# Patient Record
Sex: MALE | Race: WHITE | NOT HISPANIC OR LATINO | Employment: UNEMPLOYED | URBAN - METROPOLITAN AREA
[De-identification: names, ages, dates, MRNs, and addresses within clinical notes are randomized per-mention and may not be internally consistent; named-entity substitution may affect disease eponyms.]

---

## 2022-02-15 ENCOUNTER — OFFICE VISIT (OUTPATIENT)
Dept: ORTHOPEDIC SURGERY | Facility: CLINIC | Age: 52
End: 2022-02-15

## 2022-02-15 VITALS
BODY MASS INDEX: 26.53 KG/M2 | HEIGHT: 68 IN | DIASTOLIC BLOOD PRESSURE: 90 MMHG | WEIGHT: 175.04 LBS | SYSTOLIC BLOOD PRESSURE: 150 MMHG

## 2022-02-15 DIAGNOSIS — M25.512 ACUTE PAIN OF LEFT SHOULDER: Primary | ICD-10-CM

## 2022-02-15 DIAGNOSIS — S42.255A CLOSED NONDISPLACED FRACTURE OF GREATER TUBEROSITY OF LEFT HUMERUS, INITIAL ENCOUNTER: ICD-10-CM
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
## 2022-02-15 PROCEDURE — 99204 OFFICE O/P NEW MOD 45 MIN: CPT | Performed by: PHYSICIAN ASSISTANT

## 2022-02-15 PROCEDURE — 23620 CLTX GR HMRL TBRS FX WO MNPJ: CPT | Performed by: PHYSICIAN ASSISTANT

## 2022-02-15 NOTE — PROGRESS NOTES
[facility] – [place], [place] [facility] Note    Subjective     Chief Complaint   Patient presents with   • Left Upper Arm - Pain   DOI: 2/4/2022     HPI  Arun Young is a 52 y.o. male.  Right-hand-dominant. New patient presents for evaluation of left shoulder/upper arm pain.  ROCHELLE: Fell while picking up his child onto left side.    Pain scale: 8/10.  Severity of the pain moderate.  Quality of the pain dull, aching, throbbing.  Associated symptoms pain only.  Activity related to pain working.  Pain relieved by resting, ice, medication, lying down.  No reported numbness or tingling.  Prior treatments wearing sling.    Reports waking in morning with nausea and dizziness. Taking NSAID and acetaminophen/Tylenol.    Denies fever, chills, night sweats or other constitutional symptoms.      History reviewed. No pertinent past medical history.   Past Surgical History:   Procedure Laterality Date   • ADENOIDECTOMY     • APPENDECTOMY     • COLON SURGERY     • HERNIA REPAIR     • TONSILLECTOMY        Family History   Problem Relation Age of Onset   • No Known Problems Mother    • No Known Problems Father      Social History     Socioeconomic History   • Marital status:    Tobacco Use   • Smoking status: Current Some Day Smoker     Types: Cigarettes   • Smokeless tobacco: Never Used   Vaping Use   • Vaping Use: Never used   Substance and Sexual Activity   • Alcohol use: Yes   • Drug use: Defer   • Sexual activity: Defer      No current outpatient medications on file prior to visit.     No current facility-administered medications on file prior to visit.      No Known Allergies     The following portions of the patient's history were reviewed and updated as appropriate: allergies, current medications, past family history, past medical history, past social history, past surgical history and problem list.    Review of Systems   Constitutional: Negative.    HENT: Negative.    Eyes: Negative.    Respiratory: Negative.   "  Cardiovascular: Negative.    Gastrointestinal: Negative.    Endocrine: Negative.    Genitourinary: Negative.    Musculoskeletal: Positive for arthralgias.   Skin: Negative.    Allergic/Immunologic: Negative.    Neurological: Negative.    Hematological: Negative.    Psychiatric/Behavioral: Negative.         Objective      Physical Exam  /90   Ht 172.7 cm (67.99\")   Wt 79.4 kg (175 lb 0.7 oz)   BMI 26.62 kg/m²     Body mass index is 26.62 kg/m².    GENERAL APPEARANCE: awake, alert & oriented x 3, in no acute distress and well developed, well nourished  PSYCH: normal mood and affect  LUNGS:  breathing nonlabored, no wheezing  EYES: sclera anicteric, pupils equal  CARDIOVASCULAR: palpable pulses. Capillary refill less than 2 seconds  INTEGUMENTARY: skin intact, no clubbing, cyanosis  NEUROLOGIC:  Normal Sensation         Ortho Exam  Left Shoulder  Skin: Intact without any erythema, warmth or swelling.    Tenderness: Global tenderness throughout shoulder.  Motion: Not assessed at shoulder secondary to fracture. Positive stiffness to elbow and wrist.  Motor: Grossly intact to Ax/MSC/R/U/M/AIN/PIN  Sensory: Grossly intact to Ax/MSC/R/U/M nerve distributions.  Vascular: 2+ radial pulse with brisk capillary refill into each digit.      Imaging/Studies  Dr. Berrios and I reviewed plain film imaging performed on 2/6/2022 of his left humerus and shoulder.    EXAMINATION: XR SHOULDER 2+ VW LEFT-, XR HUMERUS LEFT-      INDICATION: left shoulder pain     TECHNIQUE: 3 images of the left shoulder, 2 images of the left humerus     COMPARISON: NONE     FINDINGS:      Acute mildly displaced comminuted fracture of the greater tuberosity. No  evidence of fracture extension to involve the humeral neck or humeral  head articular surface. The glenohumeral joint appears congruent without  dislocation. Normal appearance of the acromioclavicular joint. No  evidence of fracture or malalignment in the mid or distal humerus. " The  partially imaged left hemithorax appears unremarkable. Surgical clips  are noted in the left upper quadrant.     IMPRESSION:     Acute mildly comminuted and mildly displaced fracture involving the  greater tuberosity.     This report was finalized on 2/6/2022 11:26 AM by Rg Luong MD.    Assessment/Plan        ICD-10-CM ICD-9-CM   1. Acute pain of left shoulder  M25.512 719.41   2. Closed nondisplaced fracture of greater tuberosity of left humerus, initial encounter  S42.255A 812.03   3. Fall from slip, trip, or stumble, initial encounter  W01.0XXA E885.9       No orders of the defined types were placed in this encounter.       -Left shoulder pain due to greater tuberosity fracture secondary to fall.  -Continue sling for stability and support--includes sleeping in it.  -Remain NWB to LUE.  -Encourage gentle ROM elbow, wrist and digits.  -DC NSAIDs may recommend continue with acetaminophen/Tylenol.  If nausea and dizziness continue, patient should follow up with PCP.   -Provided letter for work: No use of left arm.    -Follow up in 1 week for repeat evaluation to include AP and scapular Y shoulder.  -Questions and concerns answered.    History, exam and imaging all discussed with Dr. Berrios who agrees with the above assessment and plan.      Medical Decision Making  Management Options : over-the-counter medicine and close treatment of fracture or dislocation  Data/Risk: radiology tests       Devika Zhang PA-C  02/15/22  13:46 EST               EMR Dragon/Transcription disclaimer:  Much of this encounter note is an electronic transcription of spoken language to printed text. Electronic transcription of spoken language may permit erroneous, or at times, nonsensical words or phrases to be inadvertently transcribed. Although I have reviewed the note for such errors, some may still exist.

## 2023-01-19 ENCOUNTER — OFFICE VISIT (OUTPATIENT)
Dept: FAMILY MEDICINE CLINIC | Facility: CLINIC | Age: 53
End: 2023-01-19
Payer: MEDICAID

## 2023-01-19 ENCOUNTER — PATIENT ROUNDING (BHMG ONLY) (OUTPATIENT)
Dept: FAMILY MEDICINE CLINIC | Facility: CLINIC | Age: 53
End: 2023-01-19
Payer: MEDICAID

## 2023-01-19 ENCOUNTER — LAB (OUTPATIENT)
Dept: LAB | Facility: HOSPITAL | Age: 53
End: 2023-01-19
Payer: MEDICAID

## 2023-01-19 ENCOUNTER — HOSPITAL ENCOUNTER (OUTPATIENT)
Dept: GENERAL RADIOLOGY | Facility: HOSPITAL | Age: 53
Discharge: HOME | End: 2023-01-19
Payer: MEDICAID

## 2023-01-19 VITALS
HEART RATE: 80 BPM | WEIGHT: 166 LBS | HEIGHT: 68 IN | OXYGEN SATURATION: 98 % | SYSTOLIC BLOOD PRESSURE: 136 MMHG | DIASTOLIC BLOOD PRESSURE: 82 MMHG | BODY MASS INDEX: 25.16 KG/M2

## 2023-01-19 DIAGNOSIS — Z00.00 PREVENTATIVE HEALTH CARE: Primary | ICD-10-CM

## 2023-01-19 DIAGNOSIS — M25.572 CHRONIC PAIN OF BOTH ANKLES: ICD-10-CM

## 2023-01-19 DIAGNOSIS — M25.571 CHRONIC PAIN OF BOTH ANKLES: ICD-10-CM

## 2023-01-19 DIAGNOSIS — Z71.41 ENCOUNTER FOR COUNSELING AND SURVEILLANCE FOR ALCOHOL USE DISORDER: ICD-10-CM

## 2023-01-19 DIAGNOSIS — Z87.19 HISTORY OF DIVERTICULITIS: ICD-10-CM

## 2023-01-19 DIAGNOSIS — G89.29 CHRONIC PAIN OF BOTH ANKLES: ICD-10-CM

## 2023-01-19 DIAGNOSIS — Z00.00 PREVENTATIVE HEALTH CARE: ICD-10-CM

## 2023-01-19 DIAGNOSIS — Z12.11 COLON CANCER SCREENING: ICD-10-CM

## 2023-01-19 DIAGNOSIS — E55.9 VITAMIN D DEFICIENCY: ICD-10-CM

## 2023-01-19 LAB
25(OH)D3 SERPL-MCNC: 21.1 NG/ML (ref 30–100)
ALBUMIN SERPL-MCNC: 4.2 G/DL (ref 3.5–5.2)
ALBUMIN/GLOB SERPL: 1.4 G/DL
ALP SERPL-CCNC: 78 U/L (ref 39–117)
ALT SERPL W P-5'-P-CCNC: 22 U/L (ref 1–41)
ANION GAP SERPL CALCULATED.3IONS-SCNC: 10.6 MMOL/L (ref 5–15)
AST SERPL-CCNC: 19 U/L (ref 1–40)
BACTERIA UR QL AUTO: NORMAL /HPF
BASOPHILS # BLD AUTO: 0.08 10*3/MM3 (ref 0–0.2)
BASOPHILS NFR BLD AUTO: 1 % (ref 0–1.5)
BILIRUB SERPL-MCNC: 0.2 MG/DL (ref 0–1.2)
BILIRUB UR QL STRIP: NEGATIVE
BUN SERPL-MCNC: 10 MG/DL (ref 6–20)
BUN/CREAT SERPL: 13.2 (ref 7–25)
CALCIUM SPEC-SCNC: 9.7 MG/DL (ref 8.6–10.5)
CHLORIDE SERPL-SCNC: 106 MMOL/L (ref 98–107)
CHOLEST SERPL-MCNC: 228 MG/DL (ref 0–200)
CLARITY UR: CLEAR
CO2 SERPL-SCNC: 24.4 MMOL/L (ref 22–29)
COLOR UR: YELLOW
CREAT SERPL-MCNC: 0.76 MG/DL (ref 0.76–1.27)
DEPRECATED RDW RBC AUTO: 49 FL (ref 37–54)
EGFRCR SERPLBLD CKD-EPI 2021: 108.8 ML/MIN/1.73
EOSINOPHIL # BLD AUTO: 0.31 10*3/MM3 (ref 0–0.4)
EOSINOPHIL NFR BLD AUTO: 3.8 % (ref 0.3–6.2)
ERYTHROCYTE [DISTWIDTH] IN BLOOD BY AUTOMATED COUNT: 12.9 % (ref 12.3–15.4)
GLOBULIN UR ELPH-MCNC: 2.9 GM/DL
GLUCOSE SERPL-MCNC: 87 MG/DL (ref 65–99)
GLUCOSE UR STRIP-MCNC: NEGATIVE MG/DL
HBA1C MFR BLD: 4.9 % (ref 4.8–5.6)
HCT VFR BLD AUTO: 36.7 % (ref 37.5–51)
HDLC SERPL-MCNC: 48 MG/DL (ref 40–60)
HGB BLD-MCNC: 12.7 G/DL (ref 13–17.7)
HGB UR QL STRIP.AUTO: NEGATIVE
HYALINE CASTS UR QL AUTO: NORMAL /LPF
IMM GRANULOCYTES # BLD AUTO: 0.11 10*3/MM3 (ref 0–0.05)
IMM GRANULOCYTES NFR BLD AUTO: 1.4 % (ref 0–0.5)
KETONES UR QL STRIP: NEGATIVE
LDLC SERPL CALC-MCNC: 155 MG/DL (ref 0–100)
LDLC/HDLC SERPL: 3.16 {RATIO}
LEUKOCYTE ESTERASE UR QL STRIP.AUTO: ABNORMAL
LYMPHOCYTES # BLD AUTO: 1.79 10*3/MM3 (ref 0.7–3.1)
LYMPHOCYTES NFR BLD AUTO: 22.1 % (ref 19.6–45.3)
MCH RBC QN AUTO: 35.9 PG (ref 26.6–33)
MCHC RBC AUTO-ENTMCNC: 34.6 G/DL (ref 31.5–35.7)
MCV RBC AUTO: 103.7 FL (ref 79–97)
MONOCYTES # BLD AUTO: 0.72 10*3/MM3 (ref 0.1–0.9)
MONOCYTES NFR BLD AUTO: 8.9 % (ref 5–12)
NEUTROPHILS NFR BLD AUTO: 5.08 10*3/MM3 (ref 1.7–7)
NEUTROPHILS NFR BLD AUTO: 62.8 % (ref 42.7–76)
NITRITE UR QL STRIP: NEGATIVE
NRBC BLD AUTO-RTO: 0 /100 WBC (ref 0–0.2)
PH UR STRIP.AUTO: 5.5 [PH] (ref 5–8)
PLATELET # BLD AUTO: 285 10*3/MM3 (ref 140–450)
PMV BLD AUTO: 8.9 FL (ref 6–12)
POTASSIUM SERPL-SCNC: 4.2 MMOL/L (ref 3.5–5.2)
PROT SERPL-MCNC: 7.1 G/DL (ref 6–8.5)
PROT UR QL STRIP: NEGATIVE
RBC # BLD AUTO: 3.54 10*6/MM3 (ref 4.14–5.8)
RBC # UR STRIP: NORMAL /HPF
REF LAB TEST METHOD: NORMAL
SODIUM SERPL-SCNC: 141 MMOL/L (ref 136–145)
SP GR UR STRIP: 1.01 (ref 1–1.03)
SQUAMOUS #/AREA URNS HPF: NORMAL /HPF
T4 FREE SERPL-MCNC: 0.71 NG/DL (ref 0.93–1.7)
TRIGL SERPL-MCNC: 141 MG/DL (ref 0–150)
TSH SERPL DL<=0.05 MIU/L-ACNC: 3.47 UIU/ML (ref 0.27–4.2)
UROBILINOGEN UR QL STRIP: ABNORMAL
VLDLC SERPL-MCNC: 25 MG/DL (ref 5–40)
WBC # UR STRIP: NORMAL /HPF
WBC NRBC COR # BLD: 8.09 10*3/MM3 (ref 3.4–10.8)

## 2023-01-19 PROCEDURE — 99386 PREV VISIT NEW AGE 40-64: CPT | Performed by: INTERNAL MEDICINE

## 2023-01-19 PROCEDURE — 80061 LIPID PANEL: CPT

## 2023-01-19 PROCEDURE — 3008F BODY MASS INDEX DOCD: CPT | Performed by: INTERNAL MEDICINE

## 2023-01-19 PROCEDURE — 85025 COMPLETE CBC W/AUTO DIFF WBC: CPT

## 2023-01-19 PROCEDURE — 84439 ASSAY OF FREE THYROXINE: CPT

## 2023-01-19 PROCEDURE — 81001 URINALYSIS AUTO W/SCOPE: CPT

## 2023-01-19 PROCEDURE — 84443 ASSAY THYROID STIM HORMONE: CPT

## 2023-01-19 PROCEDURE — 2014F MENTAL STATUS ASSESS: CPT | Performed by: INTERNAL MEDICINE

## 2023-01-19 PROCEDURE — 73610 X-RAY EXAM OF ANKLE: CPT

## 2023-01-19 PROCEDURE — 83036 HEMOGLOBIN GLYCOSYLATED A1C: CPT

## 2023-01-19 PROCEDURE — 80053 COMPREHEN METABOLIC PANEL: CPT

## 2023-01-19 PROCEDURE — 82306 VITAMIN D 25 HYDROXY: CPT

## 2023-01-19 RX ORDER — NALTREXONE HYDROCHLORIDE 50 MG/1
TABLET, FILM COATED ORAL
COMMUNITY
Start: 2023-01-16

## 2023-01-20 NOTE — PROGRESS NOTES
Chief Complaint   Patient presents with   • Establish Care   • Alcohol Problem     Recovering alcoholic and would like labs to check overall health.    • Lower Extremity Issue     Swelling and itchiness in legs and feet.        HPI:  Arun Young is a 52 y.o. male who presents today for establish care.  History of alcohol abuse.    ROS:  Constitutional: no fevers, night sweats or unexplained weight loss  Eyes: no vision changes  ENT: no runny nose, ear pain, sore throat  Cardio: no chest pain, palpitations  Pulm: no shortness of breath, wheezing, or cough  GI: no abdominal pain or changes in bowel movements  : no difficulty urinating  MSK: no difficulty ambulating, no joint pain  Neuro: no weakness, dizziness or headache  Psych: no trouble sleeping  Endo: no change in appetite      Past Medical History:   Diagnosis Date   • Substance abuse (HCC) 12/27/2022      Family History   Problem Relation Age of Onset   • No Known Problems Mother    • No Known Problems Father    • Stroke Maternal Grandfather    • Stroke Maternal Grandmother       Social History     Socioeconomic History   • Marital status: Single   Tobacco Use   • Smoking status: Some Days     Types: Electronic Cigarette   • Smokeless tobacco: Never   • Tobacco comments:     Smoked on and off since teenager   Vaping Use   • Vaping Use: Never used   Substance and Sexual Activity   • Alcohol use: Not Currently   • Drug use: Never   • Sexual activity: Yes     Partners: Female     Birth control/protection: Condom      No Known Allergies     There is no immunization history on file for this patient.     PE:  Vitals:    01/19/23 0748   BP: 136/82   Pulse: 80   SpO2: 98%      Body mass index is 25.25 kg/m².    Gen Appearance: NAD  HEENT: Normocephalic, PERRLA, no thyromegaly, trache midline  Heart: RRR, normal S1 and S2, no murmur  Lungs: CTA b/l, no wheezing, no crackles  Abdomen: Soft, non-tender, non-distended, no guarding and BSx4  MSK: Moves all extremities  well, normal gait, no peripheral edema  Pulses: Palpable and equal b/l  Lymph nodes: No palpable lymphadenopathy   Neuro: No focal deficits      Current Outpatient Medications   Medication Sig Dispense Refill   • naltrexone (DEPADE) 50 MG tablet        No current facility-administered medications for this visit.        Diagnoses and all orders for this visit:    1. Preventative health care (Primary)  -     CBC & Differential; Future  -     Comprehensive Metabolic Panel; Future  -     Hemoglobin A1c; Future  -     Lipid Panel; Future  -     TSH+Free T4; Future  -     Vitamin D,25-Hydroxy; Future  -     Urinalysis With Culture If Indicated - Urine, Clean Catch; Future  Counseled on healthy weight, nutrition, physical activity, cancer screening, and immunizations.    2. Encounter for counseling and surveillance for alcohol use disorder    3. History of diverticulitis    4. Colon cancer screening  -     Ambulatory Referral For Screening Colonoscopy    5. Vitamin D deficiency  -     Vitamin D,25-Hydroxy; Future    6. Chronic pain of both ankles  -     Cancel: XR Ankle 2 View Bilateral; Future         No follow-ups on file.     Dictated Utilizing Dragon Dictation    Please note that portions of this note were completed with a voice recognition program.    Part of this note may be an electronic transcription/translation of spoken language to printed text using the Dragon Dictation System.

## 2023-01-25 DIAGNOSIS — E03.9 HYPOTHYROIDISM, UNSPECIFIED TYPE: Primary | ICD-10-CM

## 2023-01-31 DIAGNOSIS — G62.9 PERIPHERAL POLYNEUROPATHY: Primary | ICD-10-CM

## 2023-02-09 NOTE — PROGRESS NOTES
Neuro Office Visit      Encounter Date: 02/10/2023   Patient Name: Arun Young  : 1970   MRN: [id number]   PCP: Dr Amezcua  Chief Complaint:    Chief Complaint   Patient presents with   • Establish Care     NP PERIPHERAL POLYNEUROPATHY NPP NEEDED TRAVEL SCREENING COMP W/HUB 23  PT fell on back and since then feels extreme numbness in feet and legs        History of Present Illness: Arun Young is a 53 y.o. male who is here today in Neurology for neuropathy    Neuropathy  Fell several months ago. Legs went numb a week later. Knees to feet with decreased sensation.  Sharp pain and worse with movement. Dull ache in knees. Denies burning sensation. Intermittent pins and needles sensation. Most pain was in knees and feet. Still has itching sensation. Can be severe. Worse in calf and lateral aspect.  Can keep him awake at night.   Tx'd anti-itch cream.  Symptoms are not progressive.   Difficult to stand for long periods of times. Feels the urge to move his legs.      Alcohol Abuse  During the pandemic he drank a 5th a whiskey a day for 2 years.  [facility]. AA. Needs a sponsor. Taking naltrexone.  Had a DUI and was in skilled nursing. Had edema in bilat lower ext. Treated with compression socks. No further swelling.     Healthy diet.  No exposure to chemo or radiation. No tick bites.    Labs:cmp, A1c, tsh, cbc  Vit D def-treated with otc meds.  PMH: alcohol abuse  FH:stroke. Neg FH of neuropathy  SH: tob use  Subjective      Past Medical History:   Past Medical History:   Diagnosis Date   • Alcohol abuse 02/10/2023   • Difficulty walking 2022    Had a fall that has affected my legs i believe   • Diverticulitis    • Headache, tension-type 2020    Sometimes but not all the time   • Substance abuse (HCC) 2022       Past Surgical History:   Past Surgical History:   Procedure Laterality Date   • ADENOIDECTOMY     • APPENDECTOMY     • COLON SURGERY     • HERNIA REPAIR     • TONSILLECTOMY         Family  History:   Family History   Problem Relation Age of Onset   • No Known Problems Mother    • Dementia Father         Developed during covid   • No Known Problems Sister    • No Known Problems Sister    • Stroke Maternal Grandmother    • Stroke Maternal Grandfather    • Parkinsonism Paternal Grandfather    • No Known Problems Son    • No Known Problems Son        Social History:   Social History     Socioeconomic History   • Marital status: Single   Tobacco Use   • Smoking status: Some Days     Types: Electronic Cigarette   • Smokeless tobacco: Never   • Tobacco comments:     Smoked on and off since teenager   Vaping Use   • Vaping Use: Every day   Substance and Sexual Activity   • Alcohol use: Not Currently   • Drug use: Never   • Sexual activity: Yes     Partners: Female     Birth control/protection: Condom       Medications:     Current Outpatient Medications:   •  multivitamin with minerals tablet tablet, Take 1 tablet by mouth Daily., Disp: , Rfl:   •  naltrexone (DEPADE) 50 MG tablet, , Disp: , Rfl:   •  vitamin D3 125 MCG (5000 UT) capsule capsule, Take 5,000 Units by mouth Daily., Disp: , Rfl:   •  amitriptyline (ELAVIL) 25 MG tablet, Take 1 tablet by mouth Every Night., Disp: 30 tablet, Rfl: 5  •  loratadine (Claritin) 10 MG tablet, Take 1 tablet by mouth Daily., Disp: 30 tablet, Rfl: 2    Allergies:   No Known Allergies    PHQ-9 Total Score:     STEADI Fall Risk Assessment has not been completed.    Objective     Physical Exam:   Physical Exam  Eyes:      Pupils: Pupils are equal, round, and reactive to light.   Neurological:      Mental Status: He is oriented to person, place, and time.      Gait: Tandem walk normal.      Deep Tendon Reflexes:      Reflex Scores:       Tricep reflexes are 2+ on the right side and 2+ on the left side.       Bicep reflexes are 2+ on the right side and 2+ on the left side.       Brachioradialis reflexes are 2+ on the right side and 2+ on the left side.       Patellar reflexes  are 2+ on the right side and 2+ on the left side.       Achilles reflexes are 2+ on the right side and 2+ on the left side.  Psychiatric:         Speech: Speech normal.         Neurologic Exam     Mental Status   Oriented to person, place, and time.   Follows 3 step commands.   Attention: normal. Concentration: normal.   Speech: speech is normal   Level of consciousness: alert  Knowledge: consistent with education.   Normal comprehension.     Cranial Nerves     CN III, IV, VI   Pupils are equal, round, and reactive to light.  Right pupil: Accommodation: intact.   Left pupil: Accommodation: intact.   CN III: no CN III palsy  CN VI: no CN VI palsy  Nystagmus: none   Diplopia: none  Upgaze: normal  Downgaze: normal  Conjugate gaze: present    CN VII   Facial expression full, symmetric.     CN VIII   Hearing: intact    CN XII   CN XII normal.     Motor Exam   Muscle bulk: normal  Overall muscle tone: normal    Strength   Right biceps: 5/5  Left biceps: 5/5  Right triceps: 5/5  Left triceps: 5/5  Right interossei: 5/5  Left interossei: 5/5  Right quadriceps: 5/5  Left quadriceps: 5/5  Right anterior tibial: 5/5  Left anterior tibial: 5/5  Right posterior tibial: 5/5  Left posterior tibial: 5/5    Sensory Exam   Right arm light touch: normal  Left arm light touch: normal  Right leg light touch: decreased from toes  Left leg light touch: decreased from toes    Gait, Coordination, and Reflexes     Gait  Gait: wide-based    Coordination   Tandem walking coordination: normal    Tremor   Resting tremor: absent  Action tremor: absent    Reflexes   Right brachioradialis: 2+  Left brachioradialis: 2+  Right biceps: 2+  Left biceps: 2+  Right triceps: 2+  Left triceps: 2+  Right patellar: 2+  Left patellar: 2+  Right achilles: 2+  Left achilles: 2+  Right : 2+  Left : 2+High stepping gait        Vital Signs:   Vitals:    02/10/23 1453   BP: 150/78   BP Location: Left arm   Patient Position: Sitting   Cuff Size: Adult  "  Pulse: 75   SpO2: 99%  Comment: resting room air   Weight: 77.6 kg (171 lb)   Height: 172.7 cm (67.99\")     Body mass index is 26.01 kg/m².     Results:   Imaging:   XR Ankle 3+ View Bilateral    Result Date: 1/19/2023  Impression: No acute osseous abnormality or significant degenerative change. Electronically Signed: Cherelle Crespo  1/19/2023 5:26 PM EST  Workstation ID: [id number]         Assessment / Plan      Assessment/Plan:   Diagnoses and all orders for this visit:    1. Neuropathic pain (Primary)  Comments:  Check labs, emg. Start elavil. Likely due to etoh use.  Orders:  -     CK; Future  -     C-reactive Protein; Future  -     Cryoglobulin; Future  -     Heavy Metals Profile II, Urine - Urine, Clean Catch; Future  -     Hemoglobin A1c; Future  -     FOREST + PE; Future  -     ADITYA by IFA, Reflex 9-biomarkers profile; Future  -     Ammonia; Future  -     EMG & Nerve Conduction Test; Future  -     amitriptyline (ELAVIL) 25 MG tablet; Take 1 tablet by mouth Every Night.  Dispense: 30 tablet; Refill: 5  -     loratadine (Claritin) 10 MG tablet; Take 1 tablet by mouth Daily.  Dispense: 30 tablet; Refill: 2    2. History of alcohol abuse  Comments:  Congratulation on sobriety. Cont w AA and rehab.         Patient Education:     Reviewed medications, potential side effects and signs and symptoms to report. Discussed risk versus benefits of treatment plan with patient and/or family-including medications, labs and radiology that may be ordered. Addressed questions and concerns during visit. Patient and/or family verbalized understanding and agree with plan. Instructed to call the office with any questions and report to ER with any life-threatening symptoms.     Follow Up:   Return in about 3 months (around 5/10/2023) for Recheck.    During this visit the following were done:  Labs Reviewed [x]    Labs Ordered [x]    Radiology Reports Reviewed []    Radiology Ordered []    PCP Records Reviewed [x]    Referring Provider " Records Reviewed []    ER Records Reviewed []    Hospital Records Reviewed []    History Obtained From Family []    Radiology Images Reviewed []    Other Reviewed [x]    Records Requested []      Lexi Carlos, DNP, APRN

## 2023-02-10 ENCOUNTER — LAB (OUTPATIENT)
Dept: LAB | Facility: HOSPITAL | Age: 53
End: 2023-02-10
Payer: MEDICAID

## 2023-02-10 ENCOUNTER — OFFICE VISIT (OUTPATIENT)
Dept: NEUROLOGY | Facility: CLINIC | Age: 53
End: 2023-02-10
Payer: MEDICAID

## 2023-02-10 VITALS
WEIGHT: 171 LBS | OXYGEN SATURATION: 99 % | HEART RATE: 75 BPM | SYSTOLIC BLOOD PRESSURE: 150 MMHG | DIASTOLIC BLOOD PRESSURE: 78 MMHG | HEIGHT: 68 IN | BODY MASS INDEX: 25.91 KG/M2

## 2023-02-10 DIAGNOSIS — M79.2 NEUROPATHIC PAIN: ICD-10-CM

## 2023-02-10 DIAGNOSIS — M79.2 NEUROPATHIC PAIN: Primary | ICD-10-CM

## 2023-02-10 DIAGNOSIS — F10.11 HISTORY OF ALCOHOL ABUSE: ICD-10-CM

## 2023-02-10 PROBLEM — F19.10 SUBSTANCE ABUSE: Status: ACTIVE | Noted: 2022-12-27

## 2023-02-10 PROBLEM — K57.92 DIVERTICULITIS: Status: ACTIVE | Noted: 2023-02-10

## 2023-02-10 PROBLEM — R26.2 DIFFICULTY WALKING: Status: ACTIVE | Noted: 2022-09-01

## 2023-02-10 PROBLEM — G44.209 HEADACHE, TENSION-TYPE: Status: ACTIVE | Noted: 2020-02-01

## 2023-02-10 PROBLEM — F10.10 ALCOHOL ABUSE: Status: ACTIVE | Noted: 2023-02-10

## 2023-02-10 LAB
AMMONIA BLD-SCNC: 14 UMOL/L (ref 16–60)
CK SERPL-CCNC: 65 U/L (ref 20–200)
CRP SERPL-MCNC: 0.52 MG/DL (ref 0–0.5)
HBA1C MFR BLD: 5.3 % (ref 4.8–5.6)

## 2023-02-10 PROCEDURE — 86235 NUCLEAR ANTIGEN ANTIBODY: CPT

## 2023-02-10 PROCEDURE — 82570 ASSAY OF URINE CREATININE: CPT

## 2023-02-10 PROCEDURE — 86038 ANTINUCLEAR ANTIBODIES: CPT

## 2023-02-10 PROCEDURE — 82784 ASSAY IGA/IGD/IGG/IGM EACH: CPT

## 2023-02-10 PROCEDURE — 84165 PROTEIN E-PHORESIS SERUM: CPT

## 2023-02-10 PROCEDURE — 82140 ASSAY OF AMMONIA: CPT

## 2023-02-10 PROCEDURE — 86225 DNA ANTIBODY NATIVE: CPT

## 2023-02-10 PROCEDURE — 82595 ASSAY OF CRYOGLOBULIN: CPT

## 2023-02-10 PROCEDURE — 99214 OFFICE O/P EST MOD 30 MIN: CPT | Performed by: NURSE PRACTITIONER

## 2023-02-10 PROCEDURE — 86334 IMMUNOFIX E-PHORESIS SERUM: CPT

## 2023-02-10 PROCEDURE — 84155 ASSAY OF PROTEIN SERUM: CPT

## 2023-02-10 PROCEDURE — 82175 ASSAY OF ARSENIC: CPT

## 2023-02-10 PROCEDURE — 83825 ASSAY OF MERCURY: CPT

## 2023-02-10 PROCEDURE — 83655 ASSAY OF LEAD: CPT

## 2023-02-10 PROCEDURE — 83036 HEMOGLOBIN GLYCOSYLATED A1C: CPT

## 2023-02-10 PROCEDURE — 86140 C-REACTIVE PROTEIN: CPT

## 2023-02-10 PROCEDURE — 36415 COLL VENOUS BLD VENIPUNCTURE: CPT

## 2023-02-10 PROCEDURE — 82550 ASSAY OF CK (CPK): CPT

## 2023-02-10 PROCEDURE — 82300 ASSAY OF CADMIUM: CPT

## 2023-02-10 RX ORDER — LORATADINE 10 MG/1
10 TABLET ORAL DAILY
Qty: 30 TABLET | Refills: 2 | Status: SHIPPED | OUTPATIENT
Start: 2023-02-10 | End: 2024-02-10

## 2023-02-10 RX ORDER — MULTIPLE VITAMINS W/ MINERALS TAB 9MG-400MCG
1 TAB ORAL DAILY
COMMUNITY

## 2023-02-10 RX ORDER — AMITRIPTYLINE HYDROCHLORIDE 25 MG/1
25 TABLET, FILM COATED ORAL NIGHTLY
Qty: 30 TABLET | Refills: 5 | Status: SHIPPED | OUTPATIENT
Start: 2023-02-10

## 2023-02-13 LAB
ALBUMIN SERPL ELPH-MCNC: 3.6 G/DL (ref 2.9–4.4)
ALBUMIN/GLOB SERPL: 1.2 {RATIO} (ref 0.7–1.7)
ALPHA1 GLOB SERPL ELPH-MCNC: 0.2 G/DL (ref 0–0.4)
ALPHA2 GLOB SERPL ELPH-MCNC: 0.7 G/DL (ref 0.4–1)
B-GLOBULIN SERPL ELPH-MCNC: 1.1 G/DL (ref 0.7–1.3)
GAMMA GLOB SERPL ELPH-MCNC: 1.1 G/DL (ref 0.4–1.8)
GLOBULIN SER-MCNC: 3.1 G/DL (ref 2.2–3.9)
IGA SERPL-MCNC: 375 MG/DL (ref 90–386)
IGG SERPL-MCNC: 1048 MG/DL (ref 603–1613)
IGM SERPL-MCNC: 112 MG/DL (ref 20–172)
INTERPRETATION SERPL IEP-IMP: NORMAL
LABORATORY COMMENT REPORT: NORMAL
M PROTEIN SERPL ELPH-MCNC: NORMAL G/DL
PROT SERPL-MCNC: 6.7 G/DL (ref 6–8.5)

## 2023-02-14 LAB
ARSENIC UR-MCNC: ABNORMAL UG/L (ref 0–9)
CADMIUM 24H UR-MCNC: ABNORMAL UG/L
CREAT UR-MCNC: 0.22 G/L (ref 0.3–3)
LEAD 24H UR-MCNC: ABNORMAL UG/L (ref 0–49)
MERCURY 24H UR-MCNC: ABNORMAL UG/L (ref 0–19)

## 2023-02-16 LAB
ANA SER QL IF: POSITIVE
ANA SPECKLED TITR SER: ABNORMAL {TITER}
CENTROMERE B AB SER-ACNC: <0.2 AI (ref 0–0.9)
CHROMATIN AB SERPL-ACNC: <0.2 AI (ref 0–0.9)
CRYOGLOB SER QL 1D COLD INC: NORMAL
DSDNA AB SER-ACNC: 1 IU/ML (ref 0–9)
ENA JO1 AB SER-ACNC: <0.2 AI (ref 0–0.9)
ENA RNP AB SER-ACNC: 0.5 AI (ref 0–0.9)
ENA SCL70 AB SER-ACNC: <0.2 AI (ref 0–0.9)
ENA SM AB SER-ACNC: <0.2 AI (ref 0–0.9)
ENA SS-A AB SER-ACNC: <0.2 AI (ref 0–0.9)
ENA SS-B AB SER-ACNC: <0.2 AI (ref 0–0.9)
LABORATORY COMMENT REPORT: ABNORMAL
Lab: ABNORMAL
Lab: ABNORMAL

## 2023-02-17 ENCOUNTER — TELEPHONE (OUTPATIENT)
Dept: NEUROLOGY | Facility: CLINIC | Age: 53
End: 2023-02-17
Payer: MEDICAID

## 2023-02-17 NOTE — TELEPHONE ENCOUNTER
Called patient and left  with results.    ----- Message from Lexi Carlos DNP, [facility] sent at 2/16/2023  4:50 PM EST -----  Please notify pt that initial ADITYA test was positive but follow up test show he does not have autoimmune connective tissue disease. Other labs show no concerning findings.

## 2025-03-04 ENCOUNTER — LAB (OUTPATIENT)
Dept: LAB | Facility: HOSPITAL | Age: 55
End: 2025-03-04
Payer: COMMERCIAL

## 2025-03-04 ENCOUNTER — OFFICE VISIT (OUTPATIENT)
Dept: FAMILY MEDICINE CLINIC | Facility: CLINIC | Age: 55
End: 2025-03-04
Payer: COMMERCIAL

## 2025-03-04 VITALS
DIASTOLIC BLOOD PRESSURE: 94 MMHG | HEART RATE: 71 BPM | OXYGEN SATURATION: 98 % | HEIGHT: 68 IN | BODY MASS INDEX: 30.92 KG/M2 | WEIGHT: 204 LBS | SYSTOLIC BLOOD PRESSURE: 140 MMHG

## 2025-03-04 DIAGNOSIS — Z12.5 ENCOUNTER FOR PROSTATE CANCER SCREENING: ICD-10-CM

## 2025-03-04 DIAGNOSIS — R51.9 NONINTRACTABLE HEADACHE, UNSPECIFIED CHRONICITY PATTERN, UNSPECIFIED HEADACHE TYPE: ICD-10-CM

## 2025-03-04 DIAGNOSIS — I10 HYPERTENSION, UNSPECIFIED TYPE: ICD-10-CM

## 2025-03-04 DIAGNOSIS — Z12.11 COLON CANCER SCREENING: ICD-10-CM

## 2025-03-04 DIAGNOSIS — Z72.0 TOBACCO ABUSE: ICD-10-CM

## 2025-03-04 DIAGNOSIS — Z00.00 PREVENTATIVE HEALTH CARE: ICD-10-CM

## 2025-03-04 DIAGNOSIS — Z00.00 PREVENTATIVE HEALTH CARE: Primary | ICD-10-CM

## 2025-03-04 DIAGNOSIS — Z87.891 PERSONAL HISTORY OF TOBACCO USE, PRESENTING HAZARDS TO HEALTH: ICD-10-CM

## 2025-03-04 DIAGNOSIS — N52.9 ERECTILE DYSFUNCTION, UNSPECIFIED ERECTILE DYSFUNCTION TYPE: ICD-10-CM

## 2025-03-04 LAB
25(OH)D3 SERPL-MCNC: 61.8 NG/ML (ref 30–100)
ALBUMIN SERPL-MCNC: 4.3 G/DL (ref 3.5–5.2)
ALBUMIN/GLOB SERPL: 1.5 G/DL
ALP SERPL-CCNC: 72 U/L (ref 39–117)
ALT SERPL W P-5'-P-CCNC: 16 U/L (ref 1–41)
ANION GAP SERPL CALCULATED.3IONS-SCNC: 11 MMOL/L (ref 5–15)
AST SERPL-CCNC: 15 U/L (ref 1–40)
BASOPHILS # BLD AUTO: 0.1 10*3/MM3 (ref 0–0.2)
BASOPHILS NFR BLD AUTO: 1.2 % (ref 0–1.5)
BILIRUB SERPL-MCNC: 0.3 MG/DL (ref 0–1.2)
BILIRUB UR QL STRIP: NEGATIVE
BUN SERPL-MCNC: 10 MG/DL (ref 6–20)
BUN/CREAT SERPL: 21.3 (ref 7–25)
CALCIUM SPEC-SCNC: 9.4 MG/DL (ref 8.6–10.5)
CHLORIDE SERPL-SCNC: 104 MMOL/L (ref 98–107)
CHOLEST SERPL-MCNC: 243 MG/DL (ref 0–200)
CLARITY UR: CLEAR
CO2 SERPL-SCNC: 24 MMOL/L (ref 22–29)
COLOR UR: YELLOW
CREAT SERPL-MCNC: 0.47 MG/DL (ref 0.76–1.27)
DEPRECATED RDW RBC AUTO: 41.6 FL (ref 37–54)
EGFRCR SERPLBLD CKD-EPI 2021: 123.5 ML/MIN/1.73
EOSINOPHIL # BLD AUTO: 0.33 10*3/MM3 (ref 0–0.4)
EOSINOPHIL NFR BLD AUTO: 4.1 % (ref 0.3–6.2)
ERYTHROCYTE [DISTWIDTH] IN BLOOD BY AUTOMATED COUNT: 12.7 % (ref 12.3–15.4)
GLOBULIN UR ELPH-MCNC: 2.9 GM/DL
GLUCOSE SERPL-MCNC: 87 MG/DL (ref 65–99)
GLUCOSE UR STRIP-MCNC: NEGATIVE MG/DL
HBA1C MFR BLD: 5.6 % (ref 4.8–5.6)
HCT VFR BLD AUTO: 44.1 % (ref 37.5–51)
HDLC SERPL-MCNC: 35 MG/DL (ref 40–60)
HGB BLD-MCNC: 15.3 G/DL (ref 13–17.7)
HGB UR QL STRIP.AUTO: NEGATIVE
HOLD SPECIMEN: NORMAL
IMM GRANULOCYTES # BLD AUTO: 0.07 10*3/MM3 (ref 0–0.05)
IMM GRANULOCYTES NFR BLD AUTO: 0.9 % (ref 0–0.5)
KETONES UR QL STRIP: NEGATIVE
LDLC SERPL CALC-MCNC: 113 MG/DL (ref 0–100)
LDLC/HDLC SERPL: 2.84 {RATIO}
LEUKOCYTE ESTERASE UR QL STRIP.AUTO: NEGATIVE
LYMPHOCYTES # BLD AUTO: 2.81 10*3/MM3 (ref 0.7–3.1)
LYMPHOCYTES NFR BLD AUTO: 34.6 % (ref 19.6–45.3)
MCH RBC QN AUTO: 31.3 PG (ref 26.6–33)
MCHC RBC AUTO-ENTMCNC: 34.7 G/DL (ref 31.5–35.7)
MCV RBC AUTO: 90.2 FL (ref 79–97)
MONOCYTES # BLD AUTO: 0.64 10*3/MM3 (ref 0.1–0.9)
MONOCYTES NFR BLD AUTO: 7.9 % (ref 5–12)
NEUTROPHILS NFR BLD AUTO: 4.18 10*3/MM3 (ref 1.7–7)
NEUTROPHILS NFR BLD AUTO: 51.3 % (ref 42.7–76)
NITRITE UR QL STRIP: NEGATIVE
NRBC BLD AUTO-RTO: 0 /100 WBC (ref 0–0.2)
PH UR STRIP.AUTO: 5.5 [PH] (ref 5–8)
PLATELET # BLD AUTO: 215 10*3/MM3 (ref 140–450)
PMV BLD AUTO: 9.5 FL (ref 6–12)
POTASSIUM SERPL-SCNC: 4 MMOL/L (ref 3.5–5.2)
PROT SERPL-MCNC: 7.2 G/DL (ref 6–8.5)
PROT UR QL STRIP: NEGATIVE
PSA SERPL-MCNC: 2.86 NG/ML (ref 0–4)
RBC # BLD AUTO: 4.89 10*6/MM3 (ref 4.14–5.8)
SODIUM SERPL-SCNC: 139 MMOL/L (ref 136–145)
SP GR UR STRIP: 1.02 (ref 1–1.03)
T4 FREE SERPL-MCNC: 1.12 NG/DL (ref 0.92–1.68)
TRIGL SERPL-MCNC: 543 MG/DL (ref 0–150)
TSH SERPL DL<=0.05 MIU/L-ACNC: 5.56 UIU/ML (ref 0.27–4.2)
UROBILINOGEN UR QL STRIP: NORMAL
VLDLC SERPL-MCNC: 95 MG/DL (ref 5–40)
WBC NRBC COR # BLD AUTO: 8.13 10*3/MM3 (ref 3.4–10.8)

## 2025-03-04 PROCEDURE — 80061 LIPID PANEL: CPT

## 2025-03-04 PROCEDURE — 83036 HEMOGLOBIN GLYCOSYLATED A1C: CPT

## 2025-03-04 PROCEDURE — 80050 GENERAL HEALTH PANEL: CPT

## 2025-03-04 PROCEDURE — 82306 VITAMIN D 25 HYDROXY: CPT

## 2025-03-04 PROCEDURE — 84439 ASSAY OF FREE THYROXINE: CPT

## 2025-03-04 PROCEDURE — G0103 PSA SCREENING: HCPCS

## 2025-03-04 PROCEDURE — 81003 URINALYSIS AUTO W/O SCOPE: CPT

## 2025-03-04 RX ORDER — SILDENAFIL 50 MG/1
50 TABLET, FILM COATED ORAL DAILY PRN
Qty: 30 TABLET | Refills: 5 | Status: SHIPPED | OUTPATIENT
Start: 2025-03-04

## 2025-03-04 RX ORDER — LOSARTAN POTASSIUM 50 MG/1
50 TABLET ORAL DAILY
Qty: 30 TABLET | Refills: 2 | Status: SHIPPED | OUTPATIENT
Start: 2025-03-04

## 2025-03-05 NOTE — PROGRESS NOTES
Chief Complaint   Patient presents with    Annual Exam     Blood pressure has been running high, having dull headaches not related to bp, stomach aches.        HPI:  Arun Young is a 55 y.o. male who presents today for annual exam.    ROS:  Constitutional: no fevers, night sweats or unexplained weight loss  Eyes: no vision changes  ENT: no runny nose, ear pain, sore throat  Cardio: no chest pain, palpitations  Pulm: no shortness of breath, wheezing, or cough  GI: no abdominal pain or changes in bowel movements  : no difficulty urinating  MSK: no difficulty ambulating, no joint pain  Neuro: no weakness, dizziness or headache  Psych: no trouble sleeping  Endo: no change in appetite      Past Medical History:   Diagnosis Date    Alcohol abuse 02/10/2023    Difficulty walking 09/2022    Had a fall that has affected my legs i believe    Diverticulitis     Diverticulosis 2009    Headache, tension-type 02/2020    Sometimes but not all the time    Substance abuse 12/27/2022      Family History   Problem Relation Age of Onset    No Known Problems Mother     Dementia Father         Developed during covid    No Known Problems Sister     No Known Problems Sister     Stroke Maternal Grandmother     Stroke Maternal Grandfather     Parkinsonism Paternal Grandfather     No Known Problems Son     No Known Problems Son       Social History     Socioeconomic History    Marital status: Single   Tobacco Use    Smoking status: Some Days     Types: Electronic Cigarette     Passive exposure: Current    Smokeless tobacco: Never    Tobacco comments:     Smoked on and off since teenager   Vaping Use    Vaping status: Every Day    Substances: Nicotine   Substance and Sexual Activity    Alcohol use: Not Currently    Drug use: Never    Sexual activity: Yes     Partners: Female     Birth control/protection: Birth control pill      No Known Allergies     There is no immunization history on file for this patient.     PE:  Vitals:    03/04/25  0858   BP: 140/94   Pulse: 71   SpO2: 98%      Body mass index is 31.03 kg/m².    Gen Appearance: NAD  HEENT: Normocephalic, PERRLA, no thyromegaly, trache midline  Heart: RRR, normal S1 and S2, no murmur  Lungs: CTA b/l, no wheezing, no crackles  Abdomen: Soft, non-tender, non-distended, no guarding and BSx4  MSK: Moves all extremities well, normal gait, no peripheral edema  Pulses: Palpable and equal b/l  Lymph nodes: No palpable lymphadenopathy   Neuro: No focal deficits      Current Outpatient Medications   Medication Sig Dispense Refill    multivitamin with minerals tablet tablet Take 1 tablet by mouth Daily.      vitamin D3 125 MCG (5000 UT) capsule capsule Take 1 capsule by mouth Daily.      amitriptyline (ELAVIL) 25 MG tablet Take 1 tablet by mouth Every Night. (Patient not taking: Reported on 3/4/2025) 30 tablet 5    loratadine (Claritin) 10 MG tablet Take 1 tablet by mouth Daily. 30 tablet 2    losartan (Cozaar) 50 MG tablet Take 1 tablet by mouth Daily. 30 tablet 2    naltrexone (DEPADE) 50 MG tablet       sildenafil (Viagra) 50 MG tablet Take 1 tablet by mouth Daily As Needed for Erectile Dysfunction. 30 tablet 5     No current facility-administered medications for this visit.        Diagnoses and all orders for this visit:    1. Preventative health care (Primary)  -     CBC & Differential; Future  -     Comprehensive Metabolic Panel; Future  -     Hemoglobin A1c; Future  -     Lipid Panel; Future  -     TSH+Free T4; Future  -     Vitamin D,25-Hydroxy; Future  -     Urinalysis With Culture If Indicated - Urine, Clean Catch; Future  Counseled on healthy weight, nutrition, physical activity, cancer screening, and immunizations.    2. Hypertension, unspecified type  -     losartan (Cozaar) 50 MG tablet; Take 1 tablet by mouth Daily.  Dispense: 30 tablet; Refill: 2    3. Nonintractable headache, unspecified chronicity pattern, unspecified headache type    4. Erectile dysfunction, unspecified erectile  dysfunction type  -     sildenafil (Viagra) 50 MG tablet; Take 1 tablet by mouth Daily As Needed for Erectile Dysfunction.  Dispense: 30 tablet; Refill: 5    5. Colon cancer screening    6. Tobacco abuse    7. Personal history of tobacco use, presenting hazards to health    8. Encounter for prostate cancer screening  -     PSA Screen; Future         Return in about 4 weeks (around 4/1/2025) for htn.     Dictated Utilizing Dragon Dictation    Please note that portions of this note were completed with a voice recognition program.    Part of this note may be an electronic transcription/translation of spoken language to printed text using the Dragon Dictation System.

## 2025-04-07 ENCOUNTER — OFFICE VISIT (OUTPATIENT)
Dept: FAMILY MEDICINE CLINIC | Facility: CLINIC | Age: 55
End: 2025-04-07
Payer: COMMERCIAL

## 2025-04-07 VITALS
SYSTOLIC BLOOD PRESSURE: 140 MMHG | HEART RATE: 77 BPM | DIASTOLIC BLOOD PRESSURE: 84 MMHG | BODY MASS INDEX: 31.43 KG/M2 | HEIGHT: 68 IN | OXYGEN SATURATION: 99 % | WEIGHT: 207.4 LBS

## 2025-04-07 DIAGNOSIS — I10 HYPERTENSION, UNSPECIFIED TYPE: Primary | ICD-10-CM

## 2025-04-07 PROCEDURE — 99214 OFFICE O/P EST MOD 30 MIN: CPT | Performed by: INTERNAL MEDICINE

## 2025-04-07 RX ORDER — LOSARTAN POTASSIUM 100 MG/1
100 TABLET ORAL DAILY
Qty: 90 TABLET | Refills: 3 | Status: SHIPPED | OUTPATIENT
Start: 2025-04-07

## 2025-04-07 NOTE — PROGRESS NOTES
Chief Complaint   Patient presents with    Hypertension     Follow up        HPI:  Arun Young is a 55 y.o. male who presents today for follow-up hypertension.  No side effects on losartan.    ROS:  Constitutional: no fevers, night sweats or unexplained weight loss  Eyes: no vision changes  ENT: no runny nose, ear pain, sore throat  Cardio: no chest pain, palpitations  Pulm: no shortness of breath, wheezing, or cough  GI: no abdominal pain or changes in bowel movements  : no difficulty urinating  MSK: no difficulty ambulating, no joint pain  Neuro: no weakness, dizziness or headache  Psych: no trouble sleeping  Endo: no change in appetite      Past Medical History:   Diagnosis Date    Alcohol abuse 02/10/2023    Difficulty walking 09/2022    Had a fall that has affected my legs i believe    Diverticulitis     Diverticulosis 2009    Headache, tension-type 02/2020    Sometimes but not all the time    Substance abuse 12/27/2022      Family History   Problem Relation Age of Onset    No Known Problems Mother     Dementia Father         Developed during covid    No Known Problems Sister     No Known Problems Sister     Stroke Maternal Grandmother     Stroke Maternal Grandfather     Parkinsonism Paternal Grandfather     No Known Problems Son     No Known Problems Son       Social History     Socioeconomic History    Marital status: Single   Tobacco Use    Smoking status: Some Days     Types: Electronic Cigarette     Passive exposure: Current    Smokeless tobacco: Never    Tobacco comments:     Smoked on and off since teenager   Vaping Use    Vaping status: Every Day    Substances: Nicotine   Substance and Sexual Activity    Alcohol use: Not Currently    Drug use: Never    Sexual activity: Yes     Partners: Female     Birth control/protection: Birth control pill      No Known Allergies     There is no immunization history on file for this patient.     PE:  Vitals:    04/07/25 0954   BP: 140/84   Pulse: 77   SpO2: 99%       Body mass index is 31.54 kg/m².    Gen Appearance: NAD  HEENT: Normocephalic, PERRLA, no thyromegaly, trache midline  Heart: RRR, normal S1 and S2, no murmur  Lungs: CTA b/l, no wheezing, no crackles  Abdomen: Soft, non-tender, non-distended, no guarding and BSx4  MSK: Moves all extremities well, normal gait, no peripheral edema  Pulses: Palpable and equal b/l  Lymph nodes: No palpable lymphadenopathy   Neuro: No focal deficits      Current Outpatient Medications   Medication Sig Dispense Refill    multivitamin with minerals tablet tablet Take 1 tablet by mouth Daily.      sildenafil (Viagra) 50 MG tablet Take 1 tablet by mouth Daily As Needed for Erectile Dysfunction. 30 tablet 5    vitamin D3 125 MCG (5000 UT) capsule capsule Take 1 capsule by mouth Daily.      amitriptyline (ELAVIL) 25 MG tablet Take 1 tablet by mouth Every Night. (Patient not taking: Reported on 4/7/2025) 30 tablet 5    loratadine (Claritin) 10 MG tablet Take 1 tablet by mouth Daily. 30 tablet 2    losartan (Cozaar) 100 MG tablet Take 1 tablet by mouth Daily. 90 tablet 3    naltrexone (DEPADE) 50 MG tablet        No current facility-administered medications for this visit.        Diagnoses and all orders for this visit:    1. Hypertension, unspecified type (Primary)  -     losartan (Cozaar) 100 MG tablet; Take 1 tablet by mouth Daily.  Dispense: 90 tablet; Refill: 3       BP improved, still slightly above goal.  Increase losartan 100 mg.  Return to clinic 4 weeks.  Return in about 4 weeks (around 5/5/2025) for htn.     Dictated Utilizing Dragon Dictation    Please note that portions of this note were completed with a voice recognition program.    Part of this note may be an electronic transcription/translation of spoken language to printed text using the Dragon Dictation System.

## 2025-04-30 DIAGNOSIS — I10 HYPERTENSION, UNSPECIFIED TYPE: ICD-10-CM

## 2025-04-30 RX ORDER — LOSARTAN POTASSIUM 100 MG/1
100 TABLET ORAL DAILY
Qty: 90 TABLET | Refills: 3 | Status: CANCELLED | OUTPATIENT
Start: 2025-04-30

## 2025-05-01 DIAGNOSIS — I10 HYPERTENSION, UNSPECIFIED TYPE: Primary | ICD-10-CM

## 2025-05-01 RX ORDER — LOSARTAN POTASSIUM 100 MG/1
100 TABLET ORAL DAILY
Qty: 90 TABLET | Refills: 3 | Status: SHIPPED | OUTPATIENT
Start: 2025-05-01

## 2025-05-09 ENCOUNTER — OFFICE VISIT (OUTPATIENT)
Dept: FAMILY MEDICINE CLINIC | Facility: CLINIC | Age: 55
End: 2025-05-09
Payer: COMMERCIAL

## 2025-05-09 VITALS
BODY MASS INDEX: 31.74 KG/M2 | HEIGHT: 68 IN | OXYGEN SATURATION: 99 % | DIASTOLIC BLOOD PRESSURE: 74 MMHG | HEART RATE: 75 BPM | WEIGHT: 209.4 LBS | SYSTOLIC BLOOD PRESSURE: 136 MMHG

## 2025-05-09 DIAGNOSIS — I10 HYPERTENSION, UNSPECIFIED TYPE: Primary | ICD-10-CM

## 2025-05-09 PROCEDURE — 99213 OFFICE O/P EST LOW 20 MIN: CPT | Performed by: INTERNAL MEDICINE

## 2025-05-09 NOTE — PROGRESS NOTES
Chief Complaint   Patient presents with    Hypertension       HPI:  Arun Young is a 55 y.o. male who presents today for f/u HTN.  No side effects.    ROS:  Constitutional: no fevers, night sweats or unexplained weight loss  Eyes: no vision changes  ENT: no runny nose, ear pain, sore throat  Cardio: no chest pain, palpitations  Pulm: no shortness of breath, wheezing, or cough  GI: no abdominal pain or changes in bowel movements  : no difficulty urinating  MSK: no difficulty ambulating, no joint pain  Neuro: no weakness, dizziness or headache  Psych: no trouble sleeping  Endo: no change in appetite      Past Medical History:   Diagnosis Date    Alcohol abuse 02/10/2023    Difficulty walking 09/2022    Had a fall that has affected my legs i believe    Diverticulitis     Diverticulosis 2009    Headache, tension-type 02/2020    Sometimes but not all the time    Substance abuse 12/27/2022      Family History   Problem Relation Age of Onset    No Known Problems Mother     Dementia Father         Developed during covid    No Known Problems Sister     No Known Problems Sister     Stroke Maternal Grandmother     Stroke Maternal Grandfather     Parkinsonism Paternal Grandfather     No Known Problems Son     No Known Problems Son       Social History     Socioeconomic History    Marital status: Single   Tobacco Use    Smoking status: Some Days     Types: Electronic Cigarette     Passive exposure: Current    Smokeless tobacco: Never    Tobacco comments:     Smoked on and off since teenager   Vaping Use    Vaping status: Every Day    Substances: Nicotine    Devices: Disposable   Substance and Sexual Activity    Alcohol use: Not Currently    Drug use: Never    Sexual activity: Yes     Partners: Female     Birth control/protection: Birth control pill      No Known Allergies     There is no immunization history on file for this patient.     PE:  Vitals:    05/09/25 0850   BP: 136/74   Pulse: 75   SpO2: 99%      Body mass index  is 31.85 kg/m².    Gen Appearance: NAD  HEENT: Normocephalic, PERRLA, no thyromegaly, trache midline  Heart: RRR, normal S1 and S2, no murmur  Lungs: CTA b/l, no wheezing, no crackles  Abdomen: Soft, non-tender, non-distended, no guarding and BSx4  MSK: Moves all extremities well, normal gait, no peripheral edema  Pulses: Palpable and equal b/l  Lymph nodes: No palpable lymphadenopathy   Neuro: No focal deficits      Current Outpatient Medications   Medication Sig Dispense Refill    losartan (Cozaar) 100 MG tablet Take 1 tablet by mouth Daily. 90 tablet 3    multivitamin with minerals tablet tablet Take 1 tablet by mouth Daily.      sildenafil (Viagra) 50 MG tablet Take 1 tablet by mouth Daily As Needed for Erectile Dysfunction. 30 tablet 5    vitamin D3 125 MCG (5000 UT) capsule capsule Take 1 capsule by mouth Daily.      amitriptyline (ELAVIL) 25 MG tablet Take 1 tablet by mouth Every Night. (Patient not taking: Reported on 3/4/2025) 30 tablet 5    loratadine (Claritin) 10 MG tablet Take 1 tablet by mouth Daily. 30 tablet 2    naltrexone (DEPADE) 50 MG tablet        No current facility-administered medications for this visit.        Diagnoses and all orders for this visit:    1. Hypertension, unspecified type (Primary)  BP is well-controlled at 136/74, continue losartan 100 mg.       Return in about 10 months (around 3/9/2026) for Annual physical.     Dictated Utilizing Dragon Dictation    Please note that portions of this note were completed with a voice recognition program.    Part of this note may be an electronic transcription/translation of spoken language to printed text using the Dragon Dictation System.